# Patient Record
Sex: MALE | Race: WHITE | NOT HISPANIC OR LATINO | URBAN - METROPOLITAN AREA
[De-identification: names, ages, dates, MRNs, and addresses within clinical notes are randomized per-mention and may not be internally consistent; named-entity substitution may affect disease eponyms.]

---
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
## 2022-10-26 NOTE — ED PROVIDER NOTE - NS ED MD EM SELECTION
[[email or web address]] : Reviewed on September 16, 2022\par EKG as noted above\par \par Reviewed on October 26, 2022\par Exercise treadmill stress test nonischemic\par Echocardiogram preserved EF\par Labs from September 21, 2022
60052 Exp Problem Focused - Mod. Complex